# Patient Record
Sex: MALE | Race: WHITE | NOT HISPANIC OR LATINO | Employment: OTHER | URBAN - METROPOLITAN AREA
[De-identification: names, ages, dates, MRNs, and addresses within clinical notes are randomized per-mention and may not be internally consistent; named-entity substitution may affect disease eponyms.]

---

## 2024-01-10 DIAGNOSIS — R06.02 SOB (SHORTNESS OF BREATH): Primary | ICD-10-CM

## 2024-01-11 ENCOUNTER — HOSPITAL ENCOUNTER (OUTPATIENT)
Dept: RADIOLOGY | Facility: HOSPITAL | Age: 84
Discharge: HOME | End: 2024-01-11
Attending: PHYSICIAN ASSISTANT
Payer: MEDICARE

## 2024-01-11 ENCOUNTER — CLINICAL SUPPORT (OUTPATIENT)
Dept: PULMONOLOGY | Facility: CLINIC | Age: 84
End: 2024-01-11
Payer: MEDICARE

## 2024-01-11 ENCOUNTER — OFFICE VISIT (OUTPATIENT)
Dept: PULMONOLOGY | Facility: CLINIC | Age: 84
End: 2024-01-11
Payer: MEDICARE

## 2024-01-11 VITALS — WEIGHT: 221.13 LBS | BODY MASS INDEX: 29.95 KG/M2 | HEIGHT: 72 IN

## 2024-01-11 VITALS
HEART RATE: 55 BPM | DIASTOLIC BLOOD PRESSURE: 72 MMHG | WEIGHT: 221.13 LBS | SYSTOLIC BLOOD PRESSURE: 132 MMHG | RESPIRATION RATE: 18 BRPM | HEIGHT: 72 IN | BODY MASS INDEX: 29.95 KG/M2 | OXYGEN SATURATION: 100 %

## 2024-01-11 DIAGNOSIS — R06.02 SOB (SHORTNESS OF BREATH): Primary | ICD-10-CM

## 2024-01-11 DIAGNOSIS — R06.02 SOB (SHORTNESS OF BREATH): ICD-10-CM

## 2024-01-11 LAB
ALLENS TEST: ABNORMAL
BRPFT: ABNORMAL
DELSYS: ABNORMAL
DLCO ADJ PRE: 19.85 ML/(MIN*MMHG) (ref 19.22–33.08)
DLCO SINGLE BREATH LLN: 19.22
DLCO SINGLE BREATH PRE REF: 75.9 %
DLCO SINGLE BREATH REF: 26.15
DLCOC SBVA LLN: 2.4
DLCOC SBVA PRE REF: 88.6 %
DLCOC SBVA REF: 3.47
DLCOC SINGLE BREATH LLN: 19.22
DLCOC SINGLE BREATH PRE REF: 75.9 %
DLCOC SINGLE BREATH REF: 26.15
DLCOVA LLN: 2.4
DLCOVA PRE REF: 88.6 %
DLCOVA PRE: 3.07 ML/(MIN*MMHG*L) (ref 2.4–4.53)
DLCOVA REF: 3.47
DLVAADJ PRE: 3.07 ML/(MIN*MMHG*L) (ref 2.4–4.53)
ERV LLN: -16449.07
ERV PRE REF: 53.6 %
ERV REF: 0.93
FEF 25 75 CHG: 44.9 %
FEF 25 75 LLN: 0.71
FEF 25 75 POST REF: 88.1 %
FEF 25 75 PRE REF: 60.8 %
FEF 25 75 REF: 1.98
FET100 CHG: -20.1 %
FEV1 CHG: 11.1 %
FEV1 FVC CHG: 12.8 %
FEV1 FVC LLN: 59
FEV1 FVC POST REF: 90.9 %
FEV1 FVC PRE REF: 80.6 %
FEV1 FVC REF: 74
FEV1 LLN: 2
FEV1 POST REF: 104.7 %
FEV1 PRE REF: 94.3 %
FEV1 REF: 2.94
FIO2: 21
FRCPLETH LLN: 2.96
FRCPLETH PREREF: 96 %
FRCPLETH REF: 3.95
FVC CHG: -1.6 %
FVC LLN: 2.87
FVC POST REF: 113.8 %
FVC PRE REF: 115.6 %
FVC REF: 4.03
HCO3 UR-SCNC: 22 MMOL/L (ref 24–28)
IVC PRE: 3.76 L (ref 2.87–5.18)
IVC SINGLE BREATH LLN: 2.87
IVC SINGLE BREATH PRE REF: 93.3 %
IVC SINGLE BREATH REF: 4.03
MODE: ABNORMAL
MVV LLN: 100
MVV PRE REF: 76.5 %
MVV REF: 118
PCO2 BLDA: 34.4 MMHG (ref 35–45)
PEF CHG: -5.1 %
PEF LLN: 4.73
PEF POST REF: 119.6 %
PEF PRE REF: 126 %
PEF REF: 7.18
PH SMN: 7.41 [PH] (ref 7.35–7.45)
PO2 BLDA: 69 MMHG (ref 80–100)
POC BE: -3 MMOL/L
POC SATURATED O2: 94 % (ref 95–100)
POST FEF 25 75: 1.74 L/S (ref 0.71–3.25)
POST FET 100: 13.77 SEC
POST FEV1 FVC: 67.11 % (ref 58.57–89.07)
POST FEV1: 3.08 L (ref 2–3.87)
POST FVC: 4.58 L (ref 2.87–5.18)
POST PEF: 8.59 L/S (ref 4.73–9.63)
PRE DLCO: 19.85 ML/(MIN*MMHG) (ref 19.22–33.08)
PRE ERV: 0.5 L (ref -16449.07–16450.93)
PRE FEF 25 75: 1.2 L/S (ref 0.71–3.25)
PRE FET 100: 17.24 SEC
PRE FEV1 FVC: 59.47 % (ref 58.57–89.07)
PRE FEV1: 2.77 L (ref 2–3.87)
PRE FRC PL: 3.79 L
PRE FVC: 4.66 L (ref 2.87–5.18)
PRE MVV: 90 L/MIN (ref 100.06–135.38)
PRE PEF: 9.05 L/S (ref 4.73–9.63)
PRE RV: 2.74 L (ref 2.34–3.69)
PRE TLC: 7.58 L (ref 6.39–8.69)
RAW LLN: 3.06
RAW PRE REF: 108 %
RAW PRE: 3.3 CMH2O*S/L (ref 3.06–3.06)
RAW REF: 3.06
RV LLN: 2.34
RV PRE REF: 90.9 %
RV REF: 3.02
RVTLC LLN: 38
RVTLC PRE REF: 77.4 %
RVTLC PRE: 36.17 % (ref 37.74–55.7)
RVTLC REF: 47
SAMPLE: ABNORMAL
SITE: ABNORMAL
TLC LLN: 6.39
TLC PRE REF: 100.5 %
TLC REF: 7.54
VA PRE: 6.46 L (ref 7.39–7.39)
VA SINGLE BREATH LLN: 7.39
VA SINGLE BREATH PRE REF: 87.4 %
VA SINGLE BREATH REF: 7.39
VC LLN: 2.87
VC PRE REF: 120.1 %
VC PRE: 4.84 L (ref 2.87–5.18)
VC REF: 4.03
VTGRAWPRE: 3.83 L

## 2024-01-11 PROCEDURE — 71046 X-RAY EXAM CHEST 2 VIEWS: CPT | Mod: TC

## 2024-01-11 PROCEDURE — 3078F DIAST BP <80 MM HG: CPT | Mod: CPTII,S$GLB,, | Performed by: PHYSICIAN ASSISTANT

## 2024-01-11 PROCEDURE — 3288F FALL RISK ASSESSMENT DOCD: CPT | Mod: CPTII,S$GLB,, | Performed by: PHYSICIAN ASSISTANT

## 2024-01-11 PROCEDURE — 94729 DIFFUSING CAPACITY: CPT | Mod: S$GLB,,, | Performed by: INTERNAL MEDICINE

## 2024-01-11 PROCEDURE — 94618 PULMONARY STRESS TESTING: CPT | Mod: S$GLB,,, | Performed by: INTERNAL MEDICINE

## 2024-01-11 PROCEDURE — 99999 PR PBB SHADOW E&M-EST. PATIENT-LVL IV: CPT | Mod: PBBFAC,,, | Performed by: PHYSICIAN ASSISTANT

## 2024-01-11 PROCEDURE — 3075F SYST BP GE 130 - 139MM HG: CPT | Mod: CPTII,S$GLB,, | Performed by: PHYSICIAN ASSISTANT

## 2024-01-11 PROCEDURE — 71046 X-RAY EXAM CHEST 2 VIEWS: CPT | Mod: 26,,, | Performed by: RADIOLOGY

## 2024-01-11 PROCEDURE — 82803 BLOOD GASES ANY COMBINATION: CPT | Mod: S$GLB,,, | Performed by: INTERNAL MEDICINE

## 2024-01-11 PROCEDURE — 1101F PT FALLS ASSESS-DOCD LE1/YR: CPT | Mod: CPTII,S$GLB,, | Performed by: PHYSICIAN ASSISTANT

## 2024-01-11 PROCEDURE — 94060 EVALUATION OF WHEEZING: CPT | Mod: 59,S$GLB,, | Performed by: INTERNAL MEDICINE

## 2024-01-11 PROCEDURE — 94726 PLETHYSMOGRAPHY LUNG VOLUMES: CPT | Mod: S$GLB,,, | Performed by: INTERNAL MEDICINE

## 2024-01-11 PROCEDURE — 1160F RVW MEDS BY RX/DR IN RCRD: CPT | Mod: CPTII,S$GLB,, | Performed by: PHYSICIAN ASSISTANT

## 2024-01-11 PROCEDURE — 99999 PR PBB SHADOW E&M-EST. PATIENT-LVL I: CPT | Mod: PBBFAC,,,

## 2024-01-11 PROCEDURE — 99204 OFFICE O/P NEW MOD 45 MIN: CPT | Mod: 25,S$GLB,, | Performed by: PHYSICIAN ASSISTANT

## 2024-01-11 PROCEDURE — 1159F MED LIST DOCD IN RCRD: CPT | Mod: CPTII,S$GLB,, | Performed by: PHYSICIAN ASSISTANT

## 2024-01-11 PROCEDURE — 36600 WITHDRAWAL OF ARTERIAL BLOOD: CPT | Mod: 59,S$GLB,, | Performed by: INTERNAL MEDICINE

## 2024-01-11 RX ORDER — LEVOTHYROXINE SODIUM 75 UG/1
75 TABLET ORAL
COMMUNITY

## 2024-01-11 RX ORDER — SILDENAFIL CITRATE 20 MG/1
TABLET ORAL
COMMUNITY
Start: 2024-01-02

## 2024-01-11 RX ORDER — TAMSULOSIN HYDROCHLORIDE 0.4 MG/1
1 CAPSULE ORAL
COMMUNITY
Start: 2023-12-14

## 2024-01-11 RX ORDER — MELOXICAM 7.5 MG/1
7.5 TABLET ORAL EVERY MORNING
COMMUNITY

## 2024-01-11 RX ORDER — PANTOPRAZOLE SODIUM 40 MG/1
40 TABLET, DELAYED RELEASE ORAL
COMMUNITY
Start: 2023-08-07

## 2024-01-11 NOTE — PROCEDURES
[facility] - Pulmonary Function  Six Minute Walk     SUMMARY     Ordering Provider: Shady IZAGUIRRE   Interpreting Provider: Марина MAYO  Performing nurse/tech/RT: LS RRT  Diagnosis: Shortness of Breath  Height: 6' (182.9 cm)  Weight: 100.3 kg (221 lb 1.9 oz)  BMI (Calculated): 30   Patient Race:             Phase Oxygen Assessment Supplemental O2 Heart   Rate Blood Pressure Marquez Dyspnea Scale Rating   Resting 97 % Room Air 61 bpm 156/87 0   Exercise        Minute        1 96 % Room Air 90 bpm     2 97 % Room Air 92 bpm     3 97 % Room Air 90 bpm     4 97 % Room Air 94 bpm     5 97 % Room Air 97 bpm     6  96 % Room Air 97 bpm (!) 194/92 2   Recovery        Minute        1 97 % Room Air 65 bpm     2 98 % Room Air 58 bpm     3 98 % Room Air 61 bpm     4 97 % Room Air 65 bpm 175/90 1     Six Minute Walk Summary  6MWT Status: completed without stopping  Patient Reported: No complaints     Interpretation:  Did the patient stop or pause?: No               Total Time Walked (Calculated): 360 seconds  Final Partial Lap Distance (feet): 0 feet  Total Distance Meters (Calculated): 304.8 meters  Predicted Distance Meters (Calculated): 477.35 meters  Percentage of Predicted (Calculated): 63.85  Peak VO2 (Calculated): 13.12  Mets: 3.75  Has The Patient Had a Previous Six Minute Walk Test?: No       Previous 6MWT Results  Has The Patient Had a Previous Six Minute Walk Test?: No           CLINICAL INTERPRETATION:  Six minute walk distance is 304.8m (63.85 % predicted) with very light dyspnea.  During exercise, there was no significant desaturation while breathing room air.  Blood pressure remained stable and Heart rate remained stable with walking.  The patient did not report non-pulmonary symptoms during exercise.  The patient did complete the study, walking 360 seconds of the 360 second test.  No previous study performed.  Based upon age and body mass index, exercise capacity is less than predicted.      [] Mild exercise-induced  hypoxemia described as an arterial oxygen saturation of 93-95% (or 3-4% less than at rest)  []  Moderate exercise-induced hypoxemia as 89-93%  []  Severe exercise induced hypoxemia as < 89% O2 saturation.  Medicare Criteria for oxygen prescription comments:   []  When arterial oxygen saturation is at or below 88% during exercise (severe exercise induced hypoxemia) then the patient falls under Medicare Group 1 criteria for supplemental oxygen    PAIN SCALE 8 OF 10.

## 2024-01-11 NOTE — PROGRESS NOTES
Subjective:       Patient ID: Suraj Potter is a 83 y.o. male.    Chief Complaint: Shortness of Breath      2024    Here for SOB  New patient  PMHx of HTN and hypothyroid  No known lung problems  [facility], says heart ok, does not know what tests he did    No trouble walking from car to office  Trouble with 1 flight of stairs and with Bending over  Huffing and puffing can't catch breath, when sits up straight and rest his Sob resolves    No inhalers    Broke neck 7 years ago  Chronic back and neck   Left arm weakness    Never smoker    Work histroy :     History of knee replacements    SOB is accompanied by dizziness  Denies cough, chest pain, or wheezing  Had ears checked by [facility] for dizziness, ok    Had covid last year, treated outpatinet, did fine    Stays active, likes to fish    No recent surgeries or travel      There is no immunization history on file for this patient.   Tobacco Use: Low Risk  (2024)    Patient History     Smoking Tobacco Use: Never     Smokeless Tobacco Use: Never     Passive Exposure: Not on file      History reviewed. No pertinent past medical history.   Current Outpatient Medications on File Prior to Visit   Medication Sig Dispense Refill    levothyroxine (SYNTHROID) 75 MCG tablet Take 75 mcg by mouth.      meloxicam (MOBIC) 7.5 MG tablet Take 7.5 mg by mouth every morning.      pantoprazole (PROTONIX) 40 MG tablet Take 40 mg by mouth.      sildenafil (REVATIO) 20 mg Tab SMARTSI-5 Tablet(s) By Mouth      tamsulosin (FLOMAX) 0.4 mg Cap Take 1 capsule by mouth.       No current facility-administered medications on file prior to visit.        Review of Systems   Constitutional:  Negative for fever, weight loss and appetite change.   HENT:  Negative for postnasal drip, rhinorrhea, sinus pressure, trouble swallowing and congestion.    Respiratory:  Positive for dyspnea on extertion. Negative for cough, sputum production, choking, chest  tightness, shortness of breath and wheezing.    Cardiovascular:  Negative for chest pain and leg swelling.   Musculoskeletal:  Positive for back pain. Negative for gait problem and joint swelling.   Gastrointestinal:  Negative for nausea, vomiting and abdominal pain.   Neurological:  Positive for dizziness and light-headedness. Negative for weakness and headaches.   All other systems reviewed and are negative.      Objective:       Vitals:    01/11/24 0859   BP: 132/72   Pulse: (!) 55   Resp: 18   SpO2: 100%   Weight: 100.3 kg (221 lb 1.9 oz)   Height: 6' (1.829 m)       Physical Exam   Constitutional: He is oriented to person, place, and time. He appears well-developed and well-nourished. No distress.   HENT:   Head: Normocephalic.   Mouth/Throat: Oropharynx is clear and moist.   Cardiovascular: Normal rate and regular rhythm.   Pulmonary/Chest: Effort normal. No respiratory distress. He has no wheezes. He has no rhonchi. He has no rales.   Musculoskeletal:         General: No edema.      Cervical back: Normal range of motion and neck supple.   Neurological: He is alert and oriented to person, place, and time. Gait normal.   Skin: Skin is warm and dry.   Psychiatric: He has a normal mood and affect.   Vitals reviewed.    Personal Diagnostic Review    X-Ray Chest PA And Lateral  Narrative: EXAMINATION:  XR CHEST PA AND LATERAL    CLINICAL HISTORY:  Shortness of breath    TECHNIQUE:  PA and lateral views of the chest were performed.    COMPARISON:  None    FINDINGS:  The lungs are clear and free of infiltrate.  No pleural effusion or pneumothorax. The heart is not enlarged. There is tortuosity of the descending thoracic aorta.  There are postoperative changes associated with the cervical spine.  Impression: 1.  No acute cardiopulmonary process.    Electronically signed by: Elbert Kevin DO  Date:    01/11/2024  Time:    08:54    Recent Labs     01/11/24  0936   PH 7.413   PCO2 34.4*   PO2 69*   HCO3 22.0*    POCSATURATED 94   BE -3*   Compensated respiratory alkalosis    PFT  Spirometry is normal. Spirometry remains unimproved following bronchodilator. Lung volume determination is normal. Airway mechanics show airway resistance is increased. Specific conductance remains normal. DLCO is normal. MVV is mildly decreased.     Phase Oxygen Assessment Supplemental O2 Heart   Rate Blood Pressure Marquez Dyspnea Scale Rating   Resting 97 % Room Air 61 bpm 156/87 0   Exercise             Minute             1 96 % Room Air 90 bpm       2 97 % Room Air 92 bpm       3 97 % Room Air 90 bpm       4 97 % Room Air 94 bpm       5 97 % Room Air 97 bpm       6  96 % Room Air 97 bpm (!) 194/92 2   Recovery             Minute             1 97 % Room Air 65 bpm       2 98 % Room Air 58 bpm       3 98 % Room Air 61 bpm       4 97 % Room Air 65 bpm 175/90 1      Six Minute Walk Summary  6MWT Status: completed without stopping  Patient Reported: No complaints             Interpretation:  Did the patient stop or pause?: No      Total Time Walked (Calculated): 360 seconds  Final Partial Lap Distance (feet): 0 feet  Total Distance Meters (Calculated): 304.8 meters  Predicted Distance Meters (Calculated): 477.35 meters  Percentage of Predicted (Calculated): 63.85  Peak VO2 (Calculated): 13.12  Mets: 3.75  Has The Patient Had a Previous Six Minute Walk Test?: No     Previous 6MWT Results  Has The Patient Had a Previous Six Minute Walk Test?: No              CLINICAL INTERPRETATION:  Six minute walk distance is 304.8m (63.85 % predicted) with very light dyspnea.  During exercise, there was no significant desaturation while breathing room air.  Blood pressure remained stable and Heart rate remained stable with walking.  The patient did not report non-pulmonary symptoms during exercise.  The patient did complete the study, walking 360 seconds of the 360 second test.  No previous study performed.  Based upon age and body mass index, exercise capacity is  less than predicted.    Assessment/Plan:       Normal patient, No significant desaturations requiring oxygen on walk, elevated blood pressure on walk  Will get Chest CT for dyspnea of unknown etiology  Recommend following up with cardiology  We will request [facility] from [facility] for what cardiac testing he had done    Problem List Items Addressed This Visit          Pulmonary    SOB (shortness of breath) - Primary    Relevant Orders    Complete PFT with bronchodilator (Completed)    PFT - related Arterial Blood Gas (Completed)    Stress test, pulmonary (Completed)    B-TYPE NATRIURETIC PEPTIDE    CT Chest Without Contrast       Follow up for chest ct and f/u pulmonologist next.    Discussed diagnosis, its evaluation, treatment and usual course. All questions answered.    Patient verbalized understanding of plan and left in no acute distress    Thank you for the courtesy of participating in the care of this patient    JUANPABLO RobertsDignity [facility]

## 2024-01-11 NOTE — PROCEDURES
ABG completed. See ABG Below.          Interpretation:  [] Arterial blood gases on room air demonstrate normal pCO2 and pO2  [] Arterial blood gases on room air are abnormal demonstrating hypercarbia (pCO2 >45 mmHg)  [x] Arterial blood gases on room air are abnormal demonstrating hypocarbia (pCO2 < 35 mmHg)  [x] Arterial blood gases on room air are abnormal demonstrating hypoxemia (pO2 < 80 mmHg)  [] Arterial blood gases on room air are abnormal demonstrating hyperoxemia (pO2 >120 mmHg)  [] Arterial blood gases on room air are abnormal demonstrating hypoxemia (pO2 < 80 mmHg) and hypercarbia (pCO2>45 mmHg)    The table below summarizes the main interpretations of the relationship between the arterial blood gases, pH, pCO2 and HCO-3    [x]Compensated respiratory alkalosis    I

## 2024-03-25 ENCOUNTER — TELEPHONE (OUTPATIENT)
Dept: PULMONOLOGY | Facility: CLINIC | Age: 84
End: 2024-03-25
Payer: MEDICARE

## 2024-03-25 NOTE — TELEPHONE ENCOUNTER
Contacted pt to inform of rescheduling of appt pt stated he was unaware and would have to speak to his wife about the appts informed of annual CT pt stated he will callback

## 2024-05-27 PROBLEM — E03.9 HYPOTHYROIDISM: Status: ACTIVE | Noted: 2017-03-31

## 2024-05-27 PROBLEM — G47.00 INSOMNIA: Status: ACTIVE | Noted: 2017-04-05
